# Patient Record
Sex: FEMALE | Race: BLACK OR AFRICAN AMERICAN | NOT HISPANIC OR LATINO
[De-identification: names, ages, dates, MRNs, and addresses within clinical notes are randomized per-mention and may not be internally consistent; named-entity substitution may affect disease eponyms.]

---

## 2017-11-03 ENCOUNTER — APPOINTMENT (OUTPATIENT)
Dept: MAMMOGRAPHY | Facility: CLINIC | Age: 47
End: 2017-11-03
Payer: COMMERCIAL

## 2017-11-03 ENCOUNTER — OUTPATIENT (OUTPATIENT)
Dept: OUTPATIENT SERVICES | Facility: HOSPITAL | Age: 47
LOS: 1 days | End: 2017-11-03
Payer: COMMERCIAL

## 2017-11-03 DIAGNOSIS — Z00.8 ENCOUNTER FOR OTHER GENERAL EXAMINATION: ICD-10-CM

## 2017-11-03 PROCEDURE — 77063 BREAST TOMOSYNTHESIS BI: CPT | Mod: 26

## 2017-11-03 PROCEDURE — 77067 SCR MAMMO BI INCL CAD: CPT

## 2017-11-03 PROCEDURE — G0202: CPT | Mod: 26

## 2017-11-03 PROCEDURE — 77063 BREAST TOMOSYNTHESIS BI: CPT

## 2019-01-09 ENCOUNTER — APPOINTMENT (OUTPATIENT)
Dept: MAMMOGRAPHY | Facility: CLINIC | Age: 49
End: 2019-01-09
Payer: COMMERCIAL

## 2019-01-09 ENCOUNTER — OUTPATIENT (OUTPATIENT)
Dept: OUTPATIENT SERVICES | Facility: HOSPITAL | Age: 49
LOS: 1 days | End: 2019-01-09
Payer: COMMERCIAL

## 2019-01-09 DIAGNOSIS — Z00.8 ENCOUNTER FOR OTHER GENERAL EXAMINATION: ICD-10-CM

## 2019-01-09 PROCEDURE — 77063 BREAST TOMOSYNTHESIS BI: CPT | Mod: 26

## 2019-01-09 PROCEDURE — 77067 SCR MAMMO BI INCL CAD: CPT

## 2019-01-09 PROCEDURE — 77063 BREAST TOMOSYNTHESIS BI: CPT

## 2019-01-09 PROCEDURE — 77067 SCR MAMMO BI INCL CAD: CPT | Mod: 26

## 2019-06-03 ENCOUNTER — APPOINTMENT (OUTPATIENT)
Dept: ORTHOPEDIC SURGERY | Facility: CLINIC | Age: 49
End: 2019-06-03
Payer: COMMERCIAL

## 2019-06-03 VITALS
HEIGHT: 65 IN | WEIGHT: 195 LBS | HEART RATE: 73 BPM | DIASTOLIC BLOOD PRESSURE: 95 MMHG | BODY MASS INDEX: 32.49 KG/M2 | SYSTOLIC BLOOD PRESSURE: 160 MMHG | TEMPERATURE: 98.1 F

## 2019-06-03 DIAGNOSIS — Z78.9 OTHER SPECIFIED HEALTH STATUS: ICD-10-CM

## 2019-06-03 DIAGNOSIS — M75.41 IMPINGEMENT SYNDROME OF RIGHT SHOULDER: ICD-10-CM

## 2019-06-03 PROCEDURE — 99214 OFFICE O/P EST MOD 30 MIN: CPT

## 2019-06-10 PROBLEM — Z78.9 NO PERTINENT PAST MEDICAL HISTORY: Status: RESOLVED | Noted: 2019-06-03 | Resolved: 2019-06-10

## 2019-06-12 NOTE — ADDENDUM
[FreeTextEntry1] : This note was written by Sydni Yeung on 06/11/2019 acting as a scribe for ISABEL OLIVER

## 2019-06-12 NOTE — HISTORY OF PRESENT ILLNESS
[8] : the ailment interference is 8/10 [9] : the ailment interference is 9/10 [6] : the ailment interference is 6/10 [7] : the ailment interference is 7/10 [] : No [de-identified] : Injections [de-identified] : Manual tractions [de-identified] : Dr. Mccabe [de-identified] : Massage [de-identified] : Stretches

## 2019-06-12 NOTE — PHYSICAL EXAM
[Normal] : Gait: normal [de-identified] : Appearance:  Well-developed, well-nourished female in no acute distress.\par  [de-identified] : Left Shoulder: \par Range of Motion in Degrees:   	\par    	                        Claimant:  	Normal:  	\par Abduction (Active)  	180  	180 degrees  	\par Abduction (Passive)  	180  	180 degrees  	\par Forward elevation (Active):  	180  	180 degrees  	\par Forward elevation (Passive):  180  	180 degrees  	\par External rotation (Active):  	45  	45 degrees  	\par External rotation (Passive):  	45  	45 degrees  	\par Internal rotation (Active):  	L-1  	L-1  	\par Internal rotation (Passive):  	L-1  	L-1  	\par \par No motor weakness to internal rotation, external rotation or abduction in the scapular plane.  Negative crank test.  Negative O’Destin’s test.  Negative Speed’s test. Negative Yergason’s test.  Negative cross arm test.  No tenderness to palpation at the AC joint. Negative Hawkin’s sign.  Negative Neer’s sign.  Negative apprehension. Negative sulcus sign.  No gross neurological or vascular deficits distally.  Skin is intact.  No rashes, scars or lesions. radial and ulnar pulses. No extra-articular swelling or tenderness.\par \par Right Shoulder:  \par Range of Motion in Degrees:	\par 	                                  Claimant:	Normal:	\par Abduction (Active)	                  180	               180 degrees	\par Abduction (Passive)	  180	               180 degrees	\par Forward elevation (Active):	  180	               180 degrees	\par Forward elevation (Passive):	  180	               180 degrees	\par External rotation (Active):	   45	               45 degrees	\par External rotation (Passive):	   45	               45 degrees	\par Internal rotation (Active):	   L-1	               L-1	\par Internal rotation (Passive):	   L-1	               L-1	\par  \par No motor weakness to internal rotation, external rotation or abduction in the scapular plane.  Negative crank test.  Negative O’Destin’s test.  Negative Speed’s test. Negative Yergason’s test.  Negative cross arm test.  No tenderness to palpation at the AC joint. Positive Hawkin’s sign-Mild.  Positive Neer’s sign-Mild. Negative apprehension. Negative sulcus sign.  No gross neurological or vascular deficits distally.  Skin is intact.  No rashes, scars or lesions. 2+ radial and ulnar pulses. No extra-articular swelling or tenderness.\par

## 2019-06-12 NOTE — DISCUSSION/SUMMARY
[de-identified] : At this time, due to impingement syndrome of right shoulder, I have instructed her on home therapeutic modalities, ice and anti-inflammatories.  If her symptoms worsen, I advised her to come back for further intervention.\par \par The patient has been advised that their blood pressure today was outside normal ranges and the patient was instructed accordingly. Our Blood Pressure Monitoring Sheet with instructions was given to the patient.\par

## 2020-02-06 ENCOUNTER — APPOINTMENT (OUTPATIENT)
Dept: MAMMOGRAPHY | Facility: CLINIC | Age: 50
End: 2020-02-06
Payer: COMMERCIAL

## 2020-02-06 ENCOUNTER — OUTPATIENT (OUTPATIENT)
Dept: OUTPATIENT SERVICES | Facility: HOSPITAL | Age: 50
LOS: 1 days | End: 2020-02-06
Payer: COMMERCIAL

## 2020-02-06 DIAGNOSIS — Z00.8 ENCOUNTER FOR OTHER GENERAL EXAMINATION: ICD-10-CM

## 2020-02-06 PROCEDURE — 77067 SCR MAMMO BI INCL CAD: CPT

## 2020-02-06 PROCEDURE — 77063 BREAST TOMOSYNTHESIS BI: CPT

## 2020-02-06 PROCEDURE — 77063 BREAST TOMOSYNTHESIS BI: CPT | Mod: 26

## 2020-02-06 PROCEDURE — 77067 SCR MAMMO BI INCL CAD: CPT | Mod: 26

## 2020-02-19 ENCOUNTER — OUTPATIENT (OUTPATIENT)
Dept: OUTPATIENT SERVICES | Facility: HOSPITAL | Age: 50
LOS: 1 days | End: 2020-02-19
Payer: COMMERCIAL

## 2020-02-19 ENCOUNTER — APPOINTMENT (OUTPATIENT)
Dept: MAMMOGRAPHY | Facility: CLINIC | Age: 50
End: 2020-02-19
Payer: COMMERCIAL

## 2020-02-19 ENCOUNTER — APPOINTMENT (OUTPATIENT)
Dept: ULTRASOUND IMAGING | Facility: CLINIC | Age: 50
End: 2020-02-19
Payer: COMMERCIAL

## 2020-02-19 DIAGNOSIS — Z00.8 ENCOUNTER FOR OTHER GENERAL EXAMINATION: ICD-10-CM

## 2020-02-19 PROCEDURE — 77065 DX MAMMO INCL CAD UNI: CPT

## 2020-02-19 PROCEDURE — 76642 ULTRASOUND BREAST LIMITED: CPT | Mod: 26,LT

## 2020-02-19 PROCEDURE — 77065 DX MAMMO INCL CAD UNI: CPT | Mod: 26,LT

## 2020-02-19 PROCEDURE — G0279: CPT

## 2020-02-19 PROCEDURE — G0279: CPT | Mod: 26

## 2020-02-19 PROCEDURE — 76642 ULTRASOUND BREAST LIMITED: CPT

## 2021-02-24 ENCOUNTER — TRANSCRIPTION ENCOUNTER (OUTPATIENT)
Age: 51
End: 2021-02-24

## 2021-02-24 ENCOUNTER — OUTPATIENT (OUTPATIENT)
Dept: OUTPATIENT SERVICES | Facility: HOSPITAL | Age: 51
LOS: 1 days | End: 2021-02-24
Payer: COMMERCIAL

## 2021-02-24 ENCOUNTER — APPOINTMENT (OUTPATIENT)
Dept: MAMMOGRAPHY | Facility: CLINIC | Age: 51
End: 2021-02-24
Payer: COMMERCIAL

## 2021-02-24 DIAGNOSIS — Z00.8 ENCOUNTER FOR OTHER GENERAL EXAMINATION: ICD-10-CM

## 2021-02-24 PROCEDURE — 77067 SCR MAMMO BI INCL CAD: CPT

## 2021-02-24 PROCEDURE — 77067 SCR MAMMO BI INCL CAD: CPT | Mod: 26

## 2021-02-24 PROCEDURE — 77063 BREAST TOMOSYNTHESIS BI: CPT

## 2021-02-24 PROCEDURE — 77063 BREAST TOMOSYNTHESIS BI: CPT | Mod: 26

## 2022-02-25 ENCOUNTER — OUTPATIENT (OUTPATIENT)
Dept: OUTPATIENT SERVICES | Facility: HOSPITAL | Age: 52
LOS: 1 days | End: 2022-02-25
Payer: COMMERCIAL

## 2022-02-25 ENCOUNTER — APPOINTMENT (OUTPATIENT)
Dept: MAMMOGRAPHY | Facility: CLINIC | Age: 52
End: 2022-02-25
Payer: COMMERCIAL

## 2022-02-25 ENCOUNTER — APPOINTMENT (OUTPATIENT)
Dept: ULTRASOUND IMAGING | Facility: CLINIC | Age: 52
End: 2022-02-25
Payer: COMMERCIAL

## 2022-02-25 DIAGNOSIS — Z00.8 ENCOUNTER FOR OTHER GENERAL EXAMINATION: ICD-10-CM

## 2022-02-25 PROCEDURE — 76641 ULTRASOUND BREAST COMPLETE: CPT | Mod: 26,50

## 2022-02-25 PROCEDURE — 77063 BREAST TOMOSYNTHESIS BI: CPT | Mod: 26

## 2022-02-25 PROCEDURE — 76641 ULTRASOUND BREAST COMPLETE: CPT

## 2022-02-25 PROCEDURE — 77067 SCR MAMMO BI INCL CAD: CPT | Mod: 26

## 2022-02-25 PROCEDURE — 77063 BREAST TOMOSYNTHESIS BI: CPT

## 2022-02-25 PROCEDURE — 77067 SCR MAMMO BI INCL CAD: CPT

## 2022-10-31 ENCOUNTER — RESULT CHARGE (OUTPATIENT)
Age: 52
End: 2022-10-31

## 2022-10-31 ENCOUNTER — APPOINTMENT (OUTPATIENT)
Dept: OBGYN | Facility: CLINIC | Age: 52
End: 2022-10-31

## 2022-10-31 VITALS
DIASTOLIC BLOOD PRESSURE: 69 MMHG | SYSTOLIC BLOOD PRESSURE: 169 MMHG | HEART RATE: 77 BPM | BODY MASS INDEX: 32.45 KG/M2 | WEIGHT: 195 LBS

## 2022-10-31 DIAGNOSIS — R92.2 INCONCLUSIVE MAMMOGRAM: ICD-10-CM

## 2022-10-31 DIAGNOSIS — N91.5 OLIGOMENORRHEA, UNSPECIFIED: ICD-10-CM

## 2022-10-31 LAB
BILIRUB UR QL STRIP: NORMAL
CLARITY UR: CLEAR
COLLECTION METHOD: NORMAL
GLUCOSE UR-MCNC: NORMAL
HCG UR QL: 0.2 EU/DL
HEMOGLOBIN: 12
HGB UR QL STRIP.AUTO: NORMAL
KETONES UR-MCNC: NORMAL
LEUKOCYTE ESTERASE UR QL STRIP: NORMAL
NITRITE UR QL STRIP: NORMAL
PH UR STRIP: 5.5
PROT UR STRIP-MCNC: NORMAL
SP GR UR STRIP: 1030

## 2022-10-31 PROCEDURE — 99396 PREV VISIT EST AGE 40-64: CPT

## 2022-10-31 PROCEDURE — 81003 URINALYSIS AUTO W/O SCOPE: CPT | Mod: QW

## 2022-10-31 PROCEDURE — 82270 OCCULT BLOOD FECES: CPT

## 2022-10-31 PROCEDURE — 85018 HEMOGLOBIN: CPT | Mod: QW

## 2022-11-03 NOTE — PLAN
[FreeTextEntry1] : \par \par \par Patient to follow up in 1 year for annual GYN exam\par Mammogram and bilateral breast US due: now Rx given \par Colonoscopy due:  due 2024 \par Bone density due: pm \par \par Educated patient on menopausal symptoms, advised her that 1 year no period is menopause. Once patient goes full year without a period we will start bone density. Advised patient if she does bleed after 1 year she is to call me. She verbalizes understanding and is agreeable with plan. \par \par \par Pap ordered\par Hemoccult ordered \par All questions answered, patient agreeable with plan.\par \par Written by Lyn GARCIA, acting as a scribe for Dr. Erwin. This note accurately reflects the work and decisions made by me.\par

## 2022-11-03 NOTE — HISTORY OF PRESENT ILLNESS
[TextBox_4] : Patient is a 53y/o female here today for annual visit. Patient blood pressure mildly elevated today, she did not take her HTN medications today. She is asymptomatic at this time.\par Patient still getting periods irregularly. LMP May 2022.\par Minimal vasomotor symptoms, tolerable at this time.

## 2022-11-03 NOTE — PHYSICAL EXAM
[Chaperone Present] : A chaperone was present in the examining room during all aspects of the physical examination [Appropriately responsive] : appropriately responsive [No Lymphadenopathy] : no lymphadenopathy [Soft] : soft [Non-tender] : non-tender [Oriented x3] : oriented x3 [Examination Of The Breasts] : a normal appearance [No Discharge] : no discharge [No Masses] : no breast masses were palpable [Labia Majora] : normal [Labia Minora] : normal [Normal] : normal [Uterine Adnexae] : normal [FreeTextEntry1] : Lyn JONES-BRITANY chaperoned during entire physical exam [Occult Blood Positive] : was negative for occult blood analysis

## 2022-11-07 LAB — CYTOLOGY CVX/VAG DOC THIN PREP: ABNORMAL

## 2023-02-28 ENCOUNTER — APPOINTMENT (OUTPATIENT)
Dept: ULTRASOUND IMAGING | Facility: CLINIC | Age: 53
End: 2023-02-28
Payer: COMMERCIAL

## 2023-02-28 ENCOUNTER — OUTPATIENT (OUTPATIENT)
Dept: OUTPATIENT SERVICES | Facility: HOSPITAL | Age: 53
LOS: 1 days | End: 2023-02-28
Payer: COMMERCIAL

## 2023-02-28 ENCOUNTER — RESULT REVIEW (OUTPATIENT)
Age: 53
End: 2023-02-28

## 2023-02-28 ENCOUNTER — APPOINTMENT (OUTPATIENT)
Dept: MAMMOGRAPHY | Facility: CLINIC | Age: 53
End: 2023-02-28
Payer: COMMERCIAL

## 2023-02-28 DIAGNOSIS — Z00.00 ENCOUNTER FOR GENERAL ADULT MEDICAL EXAMINATION WITHOUT ABNORMAL FINDINGS: ICD-10-CM

## 2023-02-28 PROCEDURE — 77067 SCR MAMMO BI INCL CAD: CPT | Mod: 26

## 2023-02-28 PROCEDURE — 76641 ULTRASOUND BREAST COMPLETE: CPT

## 2023-02-28 PROCEDURE — 77063 BREAST TOMOSYNTHESIS BI: CPT | Mod: 26

## 2023-02-28 PROCEDURE — 76641 ULTRASOUND BREAST COMPLETE: CPT | Mod: 26,50

## 2023-02-28 PROCEDURE — 77063 BREAST TOMOSYNTHESIS BI: CPT

## 2023-02-28 PROCEDURE — 77067 SCR MAMMO BI INCL CAD: CPT

## 2023-06-26 ENCOUNTER — NON-APPOINTMENT (OUTPATIENT)
Age: 53
End: 2023-06-26

## 2023-07-12 ENCOUNTER — APPOINTMENT (OUTPATIENT)
Dept: ANTEPARTUM | Facility: CLINIC | Age: 53
End: 2023-07-12
Payer: COMMERCIAL

## 2023-07-12 ENCOUNTER — ASOB RESULT (OUTPATIENT)
Age: 53
End: 2023-07-12

## 2023-07-12 ENCOUNTER — APPOINTMENT (OUTPATIENT)
Dept: OBGYN | Facility: CLINIC | Age: 53
End: 2023-07-12
Payer: COMMERCIAL

## 2023-07-12 LAB
BILIRUB UR QL STRIP: NORMAL
CLARITY UR: CLEAR
COLLECTION METHOD: NORMAL
GLUCOSE UR-MCNC: NORMAL
HCG UR QL: 0.2 EU/DL
HGB UR QL STRIP.AUTO: NORMAL
KETONES UR-MCNC: NORMAL
LEUKOCYTE ESTERASE UR QL STRIP: NORMAL
NITRITE UR QL STRIP: NORMAL
PH UR STRIP: 5.5
PROT UR STRIP-MCNC: NORMAL
SP GR UR STRIP: 1.03

## 2023-07-12 PROCEDURE — 99212 OFFICE O/P EST SF 10 MIN: CPT

## 2023-07-12 PROCEDURE — 81003 URINALYSIS AUTO W/O SCOPE: CPT | Mod: QW

## 2023-07-12 PROCEDURE — 76830 TRANSVAGINAL US NON-OB: CPT

## 2023-07-13 NOTE — PLAN
[FreeTextEntry1] : \par \par no vaginal exam performed today. \par sonogram wnl\par she is to monitor her bleeding pattern & to call us if she has any bleeding after halloween\par discussed bleeding episode likely from a surge of estrogen \par all questions answered; pt agreeable with plan. \par \par \par INDU GARCIA am scribing for the presence of Dr. Erwin the following sections HISTORY OF PRESENT ILLNESS, PAST MEDICAL/FAMILY/SOCIAL HISTORY; REVIEW OF SYSTEMS; VITAL SIGNS; PHYSICAL EXAM; DISPOSITION. \par \par \par I personally performed the services described in the documentation, reviewed the documentation recorded by the scribe in my presence and it accurately and completely records my words and actions.

## 2023-07-13 NOTE — HISTORY OF PRESENT ILLNESS
[FreeTextEntry1] : \par Presents today w/ c/o PMB. She went 13 months without a period. \par \par Sonogram today WNL \par 3.99 mm stripe

## 2023-11-01 ENCOUNTER — RESULT CHARGE (OUTPATIENT)
Age: 53
End: 2023-11-01

## 2023-11-01 ENCOUNTER — APPOINTMENT (OUTPATIENT)
Dept: OBGYN | Facility: CLINIC | Age: 53
End: 2023-11-01
Payer: COMMERCIAL

## 2023-11-01 VITALS
DIASTOLIC BLOOD PRESSURE: 88 MMHG | WEIGHT: 195 LBS | HEIGHT: 65 IN | HEART RATE: 86 BPM | BODY MASS INDEX: 32.49 KG/M2 | SYSTOLIC BLOOD PRESSURE: 157 MMHG

## 2023-11-01 DIAGNOSIS — Z00.00 ENCOUNTER FOR GENERAL ADULT MEDICAL EXAMINATION W/OUT ABNORMAL FINDINGS: ICD-10-CM

## 2023-11-01 LAB
BILIRUB UR QL STRIP: NORMAL
CLARITY UR: CLEAR
COLLECTION METHOD: NORMAL
DATE COLLECTED: NORMAL
GLUCOSE UR-MCNC: NORMAL
HCG UR QL: 0 EU/DL
HEMOCCULT SP1 STL QL: NEGATIVE
HEMOGLOBIN: 13
HGB UR QL STRIP.AUTO: NORMAL
KETONES UR-MCNC: NORMAL
LEUKOCYTE ESTERASE UR QL STRIP: NORMAL
NITRITE UR QL STRIP: NORMAL
PH UR STRIP: 1
PROT UR STRIP-MCNC: NORMAL
QUALITY CONTROL: YES
SP GR UR STRIP: 1

## 2023-11-01 PROCEDURE — 81003 URINALYSIS AUTO W/O SCOPE: CPT | Mod: QW

## 2023-11-01 PROCEDURE — 99396 PREV VISIT EST AGE 40-64: CPT

## 2023-11-01 PROCEDURE — 82270 OCCULT BLOOD FECES: CPT

## 2023-11-01 PROCEDURE — 85018 HEMOGLOBIN: CPT | Mod: QW

## 2023-11-03 ENCOUNTER — NON-APPOINTMENT (OUTPATIENT)
Age: 53
End: 2023-11-03

## 2023-11-03 LAB — BACTERIA UR CULT: NORMAL

## 2023-11-07 LAB — CYTOLOGY CVX/VAG DOC THIN PREP: ABNORMAL

## 2024-02-29 ENCOUNTER — RESULT REVIEW (OUTPATIENT)
Age: 54
End: 2024-02-29

## 2024-02-29 ENCOUNTER — APPOINTMENT (OUTPATIENT)
Dept: MAMMOGRAPHY | Facility: CLINIC | Age: 54
End: 2024-02-29
Payer: COMMERCIAL

## 2024-02-29 ENCOUNTER — APPOINTMENT (OUTPATIENT)
Dept: ULTRASOUND IMAGING | Facility: CLINIC | Age: 54
End: 2024-02-29
Payer: COMMERCIAL

## 2024-02-29 ENCOUNTER — APPOINTMENT (OUTPATIENT)
Dept: RADIOLOGY | Facility: CLINIC | Age: 54
End: 2024-02-29
Payer: COMMERCIAL

## 2024-02-29 ENCOUNTER — NON-APPOINTMENT (OUTPATIENT)
Age: 54
End: 2024-02-29

## 2024-02-29 ENCOUNTER — OUTPATIENT (OUTPATIENT)
Dept: OUTPATIENT SERVICES | Facility: HOSPITAL | Age: 54
LOS: 1 days | End: 2024-02-29
Payer: COMMERCIAL

## 2024-02-29 DIAGNOSIS — R92.30 DENSE BREASTS, UNSPECIFIED: ICD-10-CM

## 2024-02-29 DIAGNOSIS — Z00.8 ENCOUNTER FOR OTHER GENERAL EXAMINATION: ICD-10-CM

## 2024-02-29 PROCEDURE — 77080 DXA BONE DENSITY AXIAL: CPT | Mod: 26

## 2024-02-29 PROCEDURE — 77067 SCR MAMMO BI INCL CAD: CPT | Mod: 26

## 2024-02-29 PROCEDURE — 77067 SCR MAMMO BI INCL CAD: CPT

## 2024-02-29 PROCEDURE — 77063 BREAST TOMOSYNTHESIS BI: CPT | Mod: 26

## 2024-02-29 PROCEDURE — 76641 ULTRASOUND BREAST COMPLETE: CPT | Mod: 26,50

## 2024-02-29 PROCEDURE — 77063 BREAST TOMOSYNTHESIS BI: CPT

## 2024-02-29 PROCEDURE — 77080 DXA BONE DENSITY AXIAL: CPT

## 2024-02-29 PROCEDURE — 76641 ULTRASOUND BREAST COMPLETE: CPT

## 2024-03-01 ENCOUNTER — NON-APPOINTMENT (OUTPATIENT)
Age: 54
End: 2024-03-01

## 2024-10-30 PROBLEM — Z12.11 COLON CANCER SCREENING: Status: ACTIVE | Noted: 2024-10-30

## 2024-11-06 ENCOUNTER — APPOINTMENT (OUTPATIENT)
Dept: OBGYN | Facility: CLINIC | Age: 54
End: 2024-11-06
Payer: COMMERCIAL

## 2024-11-06 VITALS
SYSTOLIC BLOOD PRESSURE: 136 MMHG | DIASTOLIC BLOOD PRESSURE: 89 MMHG | BODY MASS INDEX: 32.82 KG/M2 | HEIGHT: 65 IN | HEART RATE: 66 BPM | WEIGHT: 197 LBS

## 2024-11-06 DIAGNOSIS — R92.30 DENSE BREASTS, UNSPECIFIED: ICD-10-CM

## 2024-11-06 DIAGNOSIS — Z12.4 ENCOUNTER FOR SCREENING FOR MALIGNANT NEOPLASM OF CERVIX: ICD-10-CM

## 2024-11-06 DIAGNOSIS — Z00.00 ENCOUNTER FOR GENERAL ADULT MEDICAL EXAMINATION W/OUT ABNORMAL FINDINGS: ICD-10-CM

## 2024-11-06 DIAGNOSIS — Z12.11 ENCOUNTER FOR SCREENING FOR MALIGNANT NEOPLASM OF COLON: ICD-10-CM

## 2024-11-06 DIAGNOSIS — Z01.419 ENCOUNTER FOR GYNECOLOGICAL EXAMINATION (GENERAL) (ROUTINE) W/OUT ABNORMAL FINDINGS: ICD-10-CM

## 2024-11-06 DIAGNOSIS — Z12.31 ENCOUNTER FOR SCREENING MAMMOGRAM FOR MALIGNANT NEOPLASM OF BREAST: ICD-10-CM

## 2024-11-06 LAB
BILIRUB UR QL STRIP: NEGATIVE
CLARITY UR: CLEAR
COLLECTION METHOD: NORMAL
DATE COLLECTED: NORMAL
GLUCOSE UR-MCNC: NEGATIVE
HCG UR QL: 0 EU/DL
HEMOCCULT SP1 STL QL: NEGATIVE
HEMOGLOBIN: 13.3
HGB UR QL STRIP.AUTO: NEGATIVE
KETONES UR-MCNC: NEGATIVE
LEUKOCYTE ESTERASE UR QL STRIP: NORMAL
NITRITE UR QL STRIP: NEGATIVE
PH UR STRIP: 6
PROT UR STRIP-MCNC: NEGATIVE
QUALITY CONTROL: YES
SP GR UR STRIP: 1

## 2024-11-06 PROCEDURE — 85018 HEMOGLOBIN: CPT | Mod: QW

## 2024-11-06 PROCEDURE — 99459 PELVIC EXAMINATION: CPT

## 2024-11-06 PROCEDURE — 81003 URINALYSIS AUTO W/O SCOPE: CPT | Mod: QW

## 2024-11-06 PROCEDURE — 99396 PREV VISIT EST AGE 40-64: CPT

## 2024-11-06 PROCEDURE — 82270 OCCULT BLOOD FECES: CPT

## 2024-11-11 LAB — CYTOLOGY CVX/VAG DOC THIN PREP: ABNORMAL

## 2025-02-03 ENCOUNTER — APPOINTMENT (OUTPATIENT)
Dept: ULTRASOUND IMAGING | Facility: CLINIC | Age: 55
End: 2025-02-03
Payer: COMMERCIAL

## 2025-02-03 ENCOUNTER — RESULT REVIEW (OUTPATIENT)
Age: 55
End: 2025-02-03

## 2025-02-03 ENCOUNTER — OUTPATIENT (OUTPATIENT)
Dept: OUTPATIENT SERVICES | Facility: HOSPITAL | Age: 55
LOS: 1 days | End: 2025-02-03
Payer: COMMERCIAL

## 2025-02-03 ENCOUNTER — APPOINTMENT (OUTPATIENT)
Dept: MAMMOGRAPHY | Facility: CLINIC | Age: 55
End: 2025-02-03
Payer: COMMERCIAL

## 2025-02-03 DIAGNOSIS — Z12.31 ENCOUNTER FOR SCREENING MAMMOGRAM FOR MALIGNANT NEOPLASM OF BREAST: ICD-10-CM

## 2025-02-03 DIAGNOSIS — Z00.8 ENCOUNTER FOR OTHER GENERAL EXAMINATION: ICD-10-CM

## 2025-02-03 DIAGNOSIS — R92.30 DENSE BREASTS, UNSPECIFIED: ICD-10-CM

## 2025-02-03 PROCEDURE — 77067 SCR MAMMO BI INCL CAD: CPT | Mod: 26

## 2025-02-03 PROCEDURE — 76641 ULTRASOUND BREAST COMPLETE: CPT

## 2025-02-03 PROCEDURE — 76641 ULTRASOUND BREAST COMPLETE: CPT | Mod: 26,50

## 2025-02-03 PROCEDURE — 77063 BREAST TOMOSYNTHESIS BI: CPT | Mod: 26

## 2025-02-03 PROCEDURE — 77063 BREAST TOMOSYNTHESIS BI: CPT

## 2025-02-03 PROCEDURE — 77067 SCR MAMMO BI INCL CAD: CPT

## 2025-03-31 ENCOUNTER — OUTPATIENT (OUTPATIENT)
Dept: OUTPATIENT SERVICES | Facility: HOSPITAL | Age: 55
LOS: 1 days | End: 2025-03-31
Payer: COMMERCIAL

## 2025-03-31 ENCOUNTER — APPOINTMENT (OUTPATIENT)
Dept: ULTRASOUND IMAGING | Facility: CLINIC | Age: 55
End: 2025-03-31
Payer: COMMERCIAL

## 2025-03-31 DIAGNOSIS — N93.9 ABNORMAL UTERINE AND VAGINAL BLEEDING, UNSPECIFIED: ICD-10-CM

## 2025-03-31 PROCEDURE — 76830 TRANSVAGINAL US NON-OB: CPT | Mod: 26

## 2025-03-31 PROCEDURE — 76830 TRANSVAGINAL US NON-OB: CPT

## 2025-04-04 ENCOUNTER — NON-APPOINTMENT (OUTPATIENT)
Age: 55
End: 2025-04-04

## 2025-04-04 DIAGNOSIS — N83.202 UNSPECIFIED OVARIAN CYST, LEFT SIDE: ICD-10-CM

## 2025-04-30 ENCOUNTER — APPOINTMENT (OUTPATIENT)
Dept: OBGYN | Facility: CLINIC | Age: 55
End: 2025-04-30

## 2025-04-30 DIAGNOSIS — N95.0 POSTMENOPAUSAL BLEEDING: ICD-10-CM

## 2025-04-30 DIAGNOSIS — N93.9 ABNORMAL UTERINE AND VAGINAL BLEEDING, UNSPECIFIED: ICD-10-CM

## 2025-04-30 PROCEDURE — 58100 BIOPSY OF UTERUS LINING: CPT

## 2025-08-05 ENCOUNTER — OUTPATIENT (OUTPATIENT)
Dept: OUTPATIENT SERVICES | Facility: HOSPITAL | Age: 55
LOS: 1 days | End: 2025-08-05
Payer: COMMERCIAL

## 2025-08-05 VITALS
RESPIRATION RATE: 16 BRPM | WEIGHT: 205.91 LBS | TEMPERATURE: 98 F | SYSTOLIC BLOOD PRESSURE: 150 MMHG | HEIGHT: 65 IN | DIASTOLIC BLOOD PRESSURE: 72 MMHG | HEART RATE: 64 BPM | OXYGEN SATURATION: 100 %

## 2025-08-05 DIAGNOSIS — Z87.42 PERSONAL HISTORY OF OTHER DISEASES OF THE FEMALE GENITAL TRACT: ICD-10-CM

## 2025-08-05 DIAGNOSIS — Z98.890 OTHER SPECIFIED POSTPROCEDURAL STATES: Chronic | ICD-10-CM

## 2025-08-05 DIAGNOSIS — Z98.891 HISTORY OF UTERINE SCAR FROM PREVIOUS SURGERY: Chronic | ICD-10-CM

## 2025-08-05 DIAGNOSIS — N95.0 POSTMENOPAUSAL BLEEDING: ICD-10-CM

## 2025-08-05 DIAGNOSIS — Z01.818 ENCOUNTER FOR OTHER PREPROCEDURAL EXAMINATION: ICD-10-CM

## 2025-08-05 LAB
ANION GAP SERPL CALC-SCNC: 3 MMOL/L — LOW (ref 5–17)
BASOPHILS # BLD AUTO: 0.02 K/UL — SIGNIFICANT CHANGE UP (ref 0–0.2)
BASOPHILS NFR BLD AUTO: 0.5 % — SIGNIFICANT CHANGE UP (ref 0–2)
BUN SERPL-MCNC: 14 MG/DL — SIGNIFICANT CHANGE UP (ref 7–23)
CALCIUM SERPL-MCNC: 9.6 MG/DL — SIGNIFICANT CHANGE UP (ref 8.5–10.1)
CHLORIDE SERPL-SCNC: 107 MMOL/L — SIGNIFICANT CHANGE UP (ref 96–108)
CO2 SERPL-SCNC: 29 MMOL/L — SIGNIFICANT CHANGE UP (ref 22–31)
CREAT SERPL-MCNC: 0.93 MG/DL — SIGNIFICANT CHANGE UP (ref 0.5–1.3)
EGFR: 72 ML/MIN/1.73M2 — SIGNIFICANT CHANGE UP
EGFR: 72 ML/MIN/1.73M2 — SIGNIFICANT CHANGE UP
EOSINOPHIL # BLD AUTO: 0.19 K/UL — SIGNIFICANT CHANGE UP (ref 0–0.5)
EOSINOPHIL NFR BLD AUTO: 4.6 % — SIGNIFICANT CHANGE UP (ref 0–6)
GLUCOSE SERPL-MCNC: 100 MG/DL — HIGH (ref 70–99)
HCT VFR BLD CALC: 39.5 % — SIGNIFICANT CHANGE UP (ref 34.5–45)
HGB BLD-MCNC: 13.2 G/DL — SIGNIFICANT CHANGE UP (ref 11.5–15.5)
IMM GRANULOCYTES # BLD AUTO: 0 K/UL — SIGNIFICANT CHANGE UP (ref 0–0.07)
IMM GRANULOCYTES NFR BLD AUTO: 0 % — SIGNIFICANT CHANGE UP (ref 0–0.9)
LYMPHOCYTES # BLD AUTO: 1.9 K/UL — SIGNIFICANT CHANGE UP (ref 1–3.3)
LYMPHOCYTES NFR BLD AUTO: 45.6 % — HIGH (ref 13–44)
MCHC RBC-ENTMCNC: 28.3 PG — SIGNIFICANT CHANGE UP (ref 27–34)
MCHC RBC-ENTMCNC: 33.4 G/DL — SIGNIFICANT CHANGE UP (ref 32–36)
MCV RBC AUTO: 84.6 FL — SIGNIFICANT CHANGE UP (ref 80–100)
MONOCYTES # BLD AUTO: 0.33 K/UL — SIGNIFICANT CHANGE UP (ref 0–0.9)
MONOCYTES NFR BLD AUTO: 7.9 % — SIGNIFICANT CHANGE UP (ref 2–14)
NEUTROPHILS # BLD AUTO: 1.73 K/UL — LOW (ref 1.8–7.4)
NEUTROPHILS NFR BLD AUTO: 41.4 % — LOW (ref 43–77)
NRBC # BLD AUTO: 0 K/UL — SIGNIFICANT CHANGE UP (ref 0–0)
NRBC # FLD: 0 K/UL — SIGNIFICANT CHANGE UP (ref 0–0)
NRBC BLD AUTO-RTO: 0 /100 WBCS — SIGNIFICANT CHANGE UP (ref 0–0)
PLATELET # BLD AUTO: 226 K/UL — SIGNIFICANT CHANGE UP (ref 150–400)
PMV BLD: 11.2 FL — SIGNIFICANT CHANGE UP (ref 7–13)
POTASSIUM SERPL-MCNC: 3.3 MMOL/L — LOW (ref 3.5–5.3)
POTASSIUM SERPL-SCNC: 3.3 MMOL/L — LOW (ref 3.5–5.3)
RBC # BLD: 4.67 M/UL — SIGNIFICANT CHANGE UP (ref 3.8–5.2)
RBC # FLD: 14.4 % — SIGNIFICANT CHANGE UP (ref 10.3–14.5)
SODIUM SERPL-SCNC: 139 MMOL/L — SIGNIFICANT CHANGE UP (ref 135–145)
WBC # BLD: 4.17 K/UL — SIGNIFICANT CHANGE UP (ref 3.8–10.5)
WBC # FLD AUTO: 4.17 K/UL — SIGNIFICANT CHANGE UP (ref 3.8–10.5)

## 2025-08-05 PROCEDURE — 86901 BLOOD TYPING SEROLOGIC RH(D): CPT

## 2025-08-05 PROCEDURE — 85025 COMPLETE CBC W/AUTO DIFF WBC: CPT

## 2025-08-05 PROCEDURE — 99214 OFFICE O/P EST MOD 30 MIN: CPT | Mod: 25

## 2025-08-05 PROCEDURE — 93005 ELECTROCARDIOGRAM TRACING: CPT

## 2025-08-05 PROCEDURE — 86900 BLOOD TYPING SEROLOGIC ABO: CPT

## 2025-08-05 PROCEDURE — 86850 RBC ANTIBODY SCREEN: CPT

## 2025-08-05 PROCEDURE — 80048 BASIC METABOLIC PNL TOTAL CA: CPT

## 2025-08-05 PROCEDURE — 36415 COLL VENOUS BLD VENIPUNCTURE: CPT

## 2025-08-05 PROCEDURE — 93010 ELECTROCARDIOGRAM REPORT: CPT

## 2025-08-05 RX ORDER — ROSUVASTATIN CALCIUM 20 MG/1
1 TABLET, FILM COATED ORAL
Refills: 0 | DISCHARGE

## 2025-08-06 DIAGNOSIS — Z01.818 ENCOUNTER FOR OTHER PREPROCEDURAL EXAMINATION: ICD-10-CM

## 2025-08-06 DIAGNOSIS — N95.0 POSTMENOPAUSAL BLEEDING: ICD-10-CM

## 2025-08-10 PROBLEM — M25.561 PAIN IN RIGHT KNEE: Chronic | Status: ACTIVE | Noted: 2025-08-05

## 2025-08-10 PROBLEM — I10 ESSENTIAL (PRIMARY) HYPERTENSION: Chronic | Status: ACTIVE | Noted: 2025-08-05

## 2025-08-10 PROBLEM — E78.5 HYPERLIPIDEMIA, UNSPECIFIED: Chronic | Status: ACTIVE | Noted: 2025-08-05

## 2025-08-10 PROBLEM — K21.9 GASTRO-ESOPHAGEAL REFLUX DISEASE WITHOUT ESOPHAGITIS: Chronic | Status: ACTIVE | Noted: 2025-08-05

## 2025-08-10 PROBLEM — S90.32XA CONTUSION OF LEFT FOOT, INITIAL ENCOUNTER: Status: ACTIVE | Noted: 2025-08-10

## 2025-08-10 PROBLEM — E03.9 HYPOTHYROIDISM, UNSPECIFIED: Chronic | Status: ACTIVE | Noted: 2025-08-05

## 2025-08-10 PROBLEM — Z87.42 PERSONAL HISTORY OF OTHER DISEASES OF THE FEMALE GENITAL TRACT: Chronic | Status: ACTIVE | Noted: 2025-08-05

## 2025-08-10 PROBLEM — M54.2 CERVICALGIA: Chronic | Status: ACTIVE | Noted: 2025-08-05

## 2025-08-11 ENCOUNTER — RESULT REVIEW (OUTPATIENT)
Age: 55
End: 2025-08-11

## 2025-08-13 ENCOUNTER — APPOINTMENT (OUTPATIENT)
Dept: ANTEPARTUM | Facility: CLINIC | Age: 55
End: 2025-08-13
Payer: COMMERCIAL

## 2025-08-13 ENCOUNTER — ASOB RESULT (OUTPATIENT)
Age: 55
End: 2025-08-13

## 2025-08-13 ENCOUNTER — APPOINTMENT (OUTPATIENT)
Dept: OBGYN | Facility: CLINIC | Age: 55
End: 2025-08-13

## 2025-08-13 VITALS
SYSTOLIC BLOOD PRESSURE: 162 MMHG | HEART RATE: 74 BPM | WEIGHT: 200 LBS | HEIGHT: 65 IN | DIASTOLIC BLOOD PRESSURE: 99 MMHG | TEMPERATURE: 98.1 F | BODY MASS INDEX: 33.32 KG/M2

## 2025-08-13 DIAGNOSIS — N95.0 POSTMENOPAUSAL BLEEDING: ICD-10-CM

## 2025-08-13 LAB — HEMOGLOBIN: 12.7

## 2025-08-13 PROCEDURE — 76857 US EXAM PELVIC LIMITED: CPT | Mod: 59

## 2025-08-13 PROCEDURE — 76830 TRANSVAGINAL US NON-OB: CPT

## 2025-08-14 ENCOUNTER — TRANSCRIPTION ENCOUNTER (OUTPATIENT)
Age: 55
End: 2025-08-14

## 2025-08-14 ENCOUNTER — APPOINTMENT (OUTPATIENT)
Dept: OBGYN | Facility: HOSPITAL | Age: 55
End: 2025-08-14

## 2025-08-14 ENCOUNTER — OUTPATIENT (OUTPATIENT)
Dept: INPATIENT UNIT | Facility: HOSPITAL | Age: 55
LOS: 1 days | Discharge: HOME | End: 2025-08-14
Payer: COMMERCIAL

## 2025-08-14 VITALS
HEART RATE: 71 BPM | RESPIRATION RATE: 15 BRPM | WEIGHT: 206.57 LBS | SYSTOLIC BLOOD PRESSURE: 167 MMHG | OXYGEN SATURATION: 100 % | DIASTOLIC BLOOD PRESSURE: 94 MMHG | TEMPERATURE: 98 F | HEIGHT: 65 IN

## 2025-08-14 VITALS
OXYGEN SATURATION: 100 % | TEMPERATURE: 97 F | HEART RATE: 63 BPM | DIASTOLIC BLOOD PRESSURE: 87 MMHG | RESPIRATION RATE: 14 BRPM | SYSTOLIC BLOOD PRESSURE: 145 MMHG

## 2025-08-14 DIAGNOSIS — N88.2 STRICTURE AND STENOSIS OF CERVIX UTERI: ICD-10-CM

## 2025-08-14 DIAGNOSIS — Z98.891 HISTORY OF UTERINE SCAR FROM PREVIOUS SURGERY: Chronic | ICD-10-CM

## 2025-08-14 DIAGNOSIS — E03.9 HYPOTHYROIDISM, UNSPECIFIED: ICD-10-CM

## 2025-08-14 DIAGNOSIS — Z98.890 OTHER SPECIFIED POSTPROCEDURAL STATES: Chronic | ICD-10-CM

## 2025-08-14 DIAGNOSIS — I10 ESSENTIAL (PRIMARY) HYPERTENSION: ICD-10-CM

## 2025-08-14 DIAGNOSIS — D25.9 LEIOMYOMA OF UTERUS, UNSPECIFIED: ICD-10-CM

## 2025-08-14 DIAGNOSIS — Z53.8 PROCEDURE AND TREATMENT NOT CARRIED OUT FOR OTHER REASONS: ICD-10-CM

## 2025-08-14 DIAGNOSIS — N95.0 POSTMENOPAUSAL BLEEDING: ICD-10-CM

## 2025-08-14 DIAGNOSIS — Z79.890 HORMONE REPLACEMENT THERAPY: ICD-10-CM

## 2025-08-14 LAB — HCG UR QL: NEGATIVE — SIGNIFICANT CHANGE UP

## 2025-08-14 PROCEDURE — 81025 URINE PREGNANCY TEST: CPT

## 2025-08-14 PROCEDURE — 58558 HYSTEROSCOPY BIOPSY: CPT | Mod: 53

## 2025-08-14 RX ORDER — LEVOTHYROXINE SODIUM 300 MCG
1 TABLET ORAL
Refills: 0 | DISCHARGE

## 2025-08-14 RX ORDER — ONDANSETRON HCL/PF 4 MG/2 ML
4 VIAL (ML) INJECTION ONCE
Refills: 0 | Status: DISCONTINUED | OUTPATIENT
Start: 2025-08-14 | End: 2025-08-14

## 2025-08-14 RX ORDER — OMEPRAZOLE 20 MG/1
1 CAPSULE, DELAYED RELEASE ORAL
Refills: 0 | DISCHARGE

## 2025-08-14 RX ORDER — ACETAMINOPHEN 500 MG/5ML
650 LIQUID (ML) ORAL EVERY 4 HOURS
Refills: 0 | Status: DISCONTINUED | OUTPATIENT
Start: 2025-08-14 | End: 2025-08-14

## 2025-08-14 RX ORDER — ACETAMINOPHEN 500 MG/5ML
1000 LIQUID (ML) ORAL ONCE
Refills: 0 | Status: DISCONTINUED | OUTPATIENT
Start: 2025-08-14 | End: 2025-08-14

## 2025-08-14 RX ORDER — ROSUVASTATIN CALCIUM 20 MG/1
1 TABLET, FILM COATED ORAL
Refills: 0 | DISCHARGE

## 2025-08-14 RX ORDER — METOCLOPRAMIDE HCL 10 MG
10 TABLET ORAL ONCE
Refills: 0 | Status: DISCONTINUED | OUTPATIENT
Start: 2025-08-14 | End: 2025-08-14

## 2025-08-14 RX ORDER — OXYCODONE HYDROCHLORIDE AND ACETAMINOPHEN 10; 325 MG/1; MG/1
2 TABLET ORAL EVERY 4 HOURS
Refills: 0 | Status: DISCONTINUED | OUTPATIENT
Start: 2025-08-14 | End: 2025-08-14

## 2025-08-14 RX ORDER — OXYCODONE HYDROCHLORIDE AND ACETAMINOPHEN 10; 325 MG/1; MG/1
1 TABLET ORAL EVERY 4 HOURS
Refills: 0 | Status: DISCONTINUED | OUTPATIENT
Start: 2025-08-14 | End: 2025-08-14

## 2025-08-14 RX ORDER — FENTANYL CITRATE-0.9 % NACL/PF 100MCG/2ML
25 SYRINGE (ML) INTRAVENOUS
Refills: 0 | Status: DISCONTINUED | OUTPATIENT
Start: 2025-08-14 | End: 2025-08-14

## 2025-08-14 RX ORDER — AMLODIPINE BESYLATE 10 MG/1
1 TABLET ORAL
Refills: 0 | DISCHARGE

## 2025-08-14 RX ORDER — OXYCODONE HYDROCHLORIDE 30 MG/1
10 TABLET ORAL ONCE
Refills: 0 | Status: DISCONTINUED | OUTPATIENT
Start: 2025-08-14 | End: 2025-08-14

## 2025-08-14 RX ORDER — ONDANSETRON HCL/PF 4 MG/2 ML
4 VIAL (ML) INJECTION EVERY 6 HOURS
Refills: 0 | Status: DISCONTINUED | OUTPATIENT
Start: 2025-08-14 | End: 2025-08-14

## 2025-08-14 RX ORDER — SODIUM CHLORIDE 9 G/1000ML
1000 INJECTION, SOLUTION INTRAVENOUS
Refills: 0 | Status: DISCONTINUED | OUTPATIENT
Start: 2025-08-14 | End: 2025-08-14

## 2025-08-14 RX ORDER — OXYCODONE HYDROCHLORIDE 30 MG/1
5 TABLET ORAL ONCE
Refills: 0 | Status: DISCONTINUED | OUTPATIENT
Start: 2025-08-14 | End: 2025-08-14

## 2025-08-20 ENCOUNTER — APPOINTMENT (OUTPATIENT)
Dept: ORTHOPEDIC SURGERY | Facility: CLINIC | Age: 55
End: 2025-08-20
Payer: COMMERCIAL

## 2025-08-20 VITALS — BODY MASS INDEX: 33.32 KG/M2 | WEIGHT: 200 LBS | HEIGHT: 65 IN

## 2025-08-20 DIAGNOSIS — S97.82XA CRUSHING INJURY OF LEFT FOOT, INITIAL ENCOUNTER: ICD-10-CM

## 2025-08-20 PROCEDURE — 99203 OFFICE O/P NEW LOW 30 MIN: CPT

## 2025-08-29 ENCOUNTER — APPOINTMENT (OUTPATIENT)
Dept: OBGYN | Facility: CLINIC | Age: 55
End: 2025-08-29

## 2025-08-29 VITALS — SYSTOLIC BLOOD PRESSURE: 161 MMHG | HEART RATE: 94 BPM | DIASTOLIC BLOOD PRESSURE: 87 MMHG

## 2025-08-29 LAB — HEMOGLOBIN: 12.6

## 2025-08-29 PROCEDURE — 99213 OFFICE O/P EST LOW 20 MIN: CPT

## 2025-08-29 PROCEDURE — 85018 HEMOGLOBIN: CPT | Mod: QW
